# Patient Record
Sex: FEMALE | Race: WHITE | NOT HISPANIC OR LATINO | Employment: FULL TIME | URBAN - NONMETROPOLITAN AREA
[De-identification: names, ages, dates, MRNs, and addresses within clinical notes are randomized per-mention and may not be internally consistent; named-entity substitution may affect disease eponyms.]

---

## 2020-03-13 ENCOUNTER — OFFICE VISIT (OUTPATIENT)
Dept: URGENT CARE | Facility: PHYSICIAN GROUP | Age: 25
End: 2020-03-13
Payer: OTHER GOVERNMENT

## 2020-03-13 VITALS
BODY MASS INDEX: 17.36 KG/M2 | SYSTOLIC BLOOD PRESSURE: 108 MMHG | WEIGHT: 108 LBS | HEIGHT: 66 IN | DIASTOLIC BLOOD PRESSURE: 66 MMHG | RESPIRATION RATE: 16 BRPM | OXYGEN SATURATION: 98 % | TEMPERATURE: 98.1 F | HEART RATE: 82 BPM

## 2020-03-13 DIAGNOSIS — R11.0 NAUSEA: ICD-10-CM

## 2020-03-13 DIAGNOSIS — O21.9 NAUSEA AND VOMITING IN PREGNANCY PRIOR TO 22 WEEKS GESTATION: ICD-10-CM

## 2020-03-13 PROCEDURE — 99203 OFFICE O/P NEW LOW 30 MIN: CPT | Performed by: NURSE PRACTITIONER

## 2020-03-13 RX ORDER — ONDANSETRON 4 MG/1
4 TABLET, FILM COATED ORAL EVERY 4 HOURS PRN
Qty: 20 TAB | Refills: 0 | Status: SHIPPED | OUTPATIENT
Start: 2020-03-13 | End: 2020-03-23

## 2020-03-13 SDOH — HEALTH STABILITY: MENTAL HEALTH: HOW OFTEN DO YOU HAVE A DRINK CONTAINING ALCOHOL?: NEVER

## 2020-03-13 ASSESSMENT — ENCOUNTER SYMPTOMS
COUGH: 0
MYALGIAS: 0
SORE THROAT: 0
ABDOMINAL PAIN: 0
NECK PAIN: 0
SHORTNESS OF BREATH: 0
NAUSEA: 1
VOMITING: 0
DIZZINESS: 0
BLURRED VISION: 0
PALPITATIONS: 0
CONSTIPATION: 0
HEADACHES: 0
WEAKNESS: 0
DOUBLE VISION: 0
FEVER: 0
WEIGHT LOSS: 0
CHILLS: 0
SINUS PAIN: 0

## 2020-03-13 NOTE — PROGRESS NOTES
Subjective:     Tamiko Hampton is a 25 y.o. female who presents for Nausea (x 2 days / )      HPI  Pt presents for evaluation of a new problem. She recently found out she is  pregnant and has been experiencing extreme nausea for the past two days. She denies any vomiting. She is able to eat small amounts of food but states it is hard to eat do to the nausea. Her symptoms remain unchanged. She has tried andrew for the relief of her symptoms.     Review of Systems   Constitutional: Negative for chills, fever and weight loss.   HENT: Negative for congestion, ear discharge, ear pain, hearing loss, sinus pain, sore throat and tinnitus.    Eyes: Negative for blurred vision and double vision.   Respiratory: Negative for cough and shortness of breath.    Cardiovascular: Negative for chest pain and palpitations.   Gastrointestinal: Positive for nausea. Negative for abdominal pain, constipation and vomiting.   Genitourinary: Negative for dysuria, frequency and urgency.   Musculoskeletal: Negative for myalgias and neck pain.   Skin: Negative for rash.   Neurological: Negative for dizziness, weakness and headaches.       PMH: History reviewed. No pertinent past medical history.  ALLERGIES: No Known Allergies  SURGHX: History reviewed. No pertinent surgical history.  SOCHX:   Social History     Socioeconomic History   • Marital status:      Spouse name: Not on file   • Number of children: Not on file   • Years of education: Not on file   • Highest education level: Not on file   Occupational History   • Not on file   Social Needs   • Financial resource strain: Not on file   • Food insecurity     Worry: Not on file     Inability: Not on file   • Transportation needs     Medical: Not on file     Non-medical: Not on file   Tobacco Use   • Smoking status: Former Smoker   • Smokeless tobacco: Never Used   Substance and Sexual Activity   • Alcohol use: Never     Frequency: Never   • Drug use: Never   • Sexual activity: Not on  "file   Lifestyle   • Physical activity     Days per week: Not on file     Minutes per session: Not on file   • Stress: Not on file   Relationships   • Social connections     Talks on phone: Not on file     Gets together: Not on file     Attends Spiritism service: Not on file     Active member of club or organization: Not on file     Attends meetings of clubs or organizations: Not on file     Relationship status: Not on file   • Intimate partner violence     Fear of current or ex partner: Not on file     Emotionally abused: Not on file     Physically abused: Not on file     Forced sexual activity: Not on file   Other Topics Concern   • Not on file   Social History Narrative   • Not on file     FH: History reviewed. No pertinent family history.      Objective:   /66   Pulse 82   Temp 36.7 °C (98.1 °F) (Temporal)   Resp 16   Ht 1.676 m (5' 6\")   Wt 49 kg (108 lb)   SpO2 98%   BMI 17.43 kg/m²     Physical Exam  Vitals signs and nursing note reviewed.   Constitutional:       Appearance: Normal appearance. She is ill-appearing.   HENT:      Head: Normocephalic and atraumatic.      Right Ear: External ear normal.      Left Ear: External ear normal.      Nose: No congestion or rhinorrhea.      Mouth/Throat:      Mouth: Mucous membranes are moist.      Pharynx: No oropharyngeal exudate or posterior oropharyngeal erythema.   Eyes:      General:         Right eye: No discharge.         Left eye: No discharge.      Extraocular Movements: Extraocular movements intact.      Pupils: Pupils are equal, round, and reactive to light.   Neck:      Musculoskeletal: Normal range of motion and neck supple.   Cardiovascular:      Rate and Rhythm: Normal rate and regular rhythm.      Heart sounds: Normal heart sounds.   Pulmonary:      Effort: Pulmonary effort is normal.      Breath sounds: Normal breath sounds.   Abdominal:      General: Abdomen is flat. There is no distension.   Musculoskeletal:         General: No swelling " or tenderness.   Skin:     General: Skin is warm and dry.      Findings: No rash.   Neurological:      General: No focal deficit present.      Mental Status: She is alert and oriented to person, place, and time. Mental status is at baseline.   Psychiatric:         Mood and Affect: Mood normal.         Behavior: Behavior normal.         Thought Content: Thought content normal.         Judgment: Judgment normal.         Assessment/Plan:   Assessment    1. Nausea     2. Nausea and vomiting in pregnancy prior to 22 weeks gestation  ondansetron (ZOFRAN) 4 MG Tab tablet     We discussed possible teratogenicside effects of taking Zofran. She states she is unable to swallow pills and would like Zofran ordered. Pt encouraged to try preggie pops, ginger ale and saltine crackers.   AVS handout given and reviewed with patient. Pt educated on red flags and when to seek treatment back in ER or UC. She has follow up iva with OBGYN next week.

## 2020-03-13 NOTE — PATIENT INSTRUCTIONS
Nausea, Adult  Nausea is the feeling of an upset stomach or having to vomit. Nausea on its own is not usually a serious concern, but it may be an early sign of a more serious medical problem. As nausea gets worse, it can lead to vomiting. If vomiting develops, or if you are not able to drink enough fluids, you are at risk of becoming dehydrated. Dehydration can make you tired and thirsty, cause you to have a dry mouth, and decrease how often you urinate. Older adults and people with other diseases or a weak immune system are at higher risk for dehydration. The main goals of treating your nausea are:  · To limit repeated nausea episodes.  · To prevent vomiting and dehydration.  Follow these instructions at home:  Follow instructions from your health care provider about how to care for yourself at home.  Eating and drinking  Follow these recommendations as told by your health care provider:  · Take an oral rehydration solution (ORS). This is a drink that is sold at pharmacies and retail stores.  · Drink clear fluids in small amounts as you are able. Clear fluids include water, ice chips, diluted fruit juice, and low-calorie sports drinks.  · Eat bland, easy-to-digest foods in small amounts as you are able. These foods include bananas, applesauce, rice, lean meats, toast, and crackers.  · Avoid drinking fluids that contain a lot of sugar or caffeine, such as energy drinks, sports drinks, and soda.  · Avoid alcohol.  · Avoid spicy or fatty foods.  General instructions  · Drink enough fluid to keep your urine clear or pale yellow.  · Wash your hands often. If soap and water are not available, use hand .  · Make sure that all people in your household wash their hands well and often.  · Rest at home while you recover.  · Take over-the-counter and prescription medicines only as told by your health care provider.  · Breathe slowly and deeply when you feel nauseous.  · Watch your condition for any changes.  · Keep  all follow-up visits as told by your health care provider. This is important.  Contact a health care provider if:  · You have a headache.  · You have new symptoms.  · Your nausea gets worse.  · You have a fever.  · You feel light-headed or dizzy.  · You vomit.  · You cannot keep fluids down.  Get help right away if:  · You have pain in your chest, neck, arm, or jaw.  · You feel extremely weak or you faint.  · You have vomit that is bright red or looks like coffee grounds.  · You have bloody or black stools or stools that look like tar.  · You have a severe headache, a stiff neck, or both.  · You have severe pain, cramping, or bloating in your abdomen.  · You have a rash.  · You have difficulty breathing or are breathing very quickly.  · Your heart is beating very quickly.  · Your skin feels cold and clammy.  · You feel confused.  · You have pain when you urinate.  · You have signs of dehydration, such as:  ¨ Dark urine, very little, or no urine.  ¨ Cracked lips.  ¨ Dry mouth.  ¨ Sunken eyes.  ¨ Sleepiness.  ¨ Weakness.  These symptoms may represent a serious problem that is an emergency. Do not wait to see if the symptoms will go away. Get medical help right away. Call your local emergency services (911 in the [place] Do not drive yourself to the hospital.   This information is not intended to replace advice given to you by your health care provider. Make sure you discuss any questions you have with your health care provider.  Document Released: 01/25/2006 Document Revised: 05/22/2017 Document Reviewed: 08/23/2016  [employer] Interactive Patient Education © 2017 [employer]